# Patient Record
Sex: FEMALE | Race: BLACK OR AFRICAN AMERICAN | NOT HISPANIC OR LATINO | URBAN - METROPOLITAN AREA
[De-identification: names, ages, dates, MRNs, and addresses within clinical notes are randomized per-mention and may not be internally consistent; named-entity substitution may affect disease eponyms.]

---

## 2023-03-10 ENCOUNTER — OFFICE (OUTPATIENT)
Dept: URBAN - METROPOLITAN AREA CLINIC 8 | Facility: CLINIC | Age: 53
End: 2023-03-10

## 2023-03-10 VITALS
SYSTOLIC BLOOD PRESSURE: 134 MMHG | DIASTOLIC BLOOD PRESSURE: 88 MMHG | RESPIRATION RATE: 99 BRPM | WEIGHT: 209 LBS | HEIGHT: 67 IN | HEART RATE: 72 BPM

## 2023-03-10 DIAGNOSIS — I10 ESSENTIAL (PRIMARY) HYPERTENSION: ICD-10-CM

## 2023-03-10 DIAGNOSIS — R06.02 SHORTNESS OF BREATH: ICD-10-CM

## 2023-03-10 DIAGNOSIS — R00.2 PALPITATIONS: ICD-10-CM

## 2023-03-10 DIAGNOSIS — K21.9 GASTRO-ESOPHAGEAL REFLUX DISEASE WITHOUT ESOPHAGITIS: ICD-10-CM

## 2023-03-10 PROCEDURE — 99203 OFFICE O/P NEW LOW 30 MIN: CPT | Performed by: NURSE PRACTITIONER

## 2023-03-10 NOTE — SERVICENOTES
She reports having an appt with Cardiology on 3/21/23 for reports of SOB and palpitations. Will plan to schedule colonoscopy at return office visit after cardiac work-up and cardiac clearance received.

## 2023-03-10 NOTE — SERVICEHPINOTES
Ms Cervantes presents for average risk colon cancer screening. She denies any constipation, diarrhea, hematochezia or melena. She denies abdominal pain. She does report heartburn for the past 10 years. She reports that she was taking Famotidine and it was recently changed to esomeprazole per her PCP. She denies dysphagia.   She does report some SOB and palpitations with some exertion over the past few weeks. She has been referred to cardiology for evaluation per her PCP.